# Patient Record
Sex: FEMALE | Race: WHITE | NOT HISPANIC OR LATINO | Employment: FULL TIME | URBAN - METROPOLITAN AREA
[De-identification: names, ages, dates, MRNs, and addresses within clinical notes are randomized per-mention and may not be internally consistent; named-entity substitution may affect disease eponyms.]

---

## 2023-04-11 LAB
CHLAMYDIA TRACH., AMPLIFIED: NEGATIVE
N. GONORRHEA, AMPLIFIED: NEGATIVE
TRICHOMONAS VAGINALIS: NEGATIVE

## 2023-04-12 LAB — URINE CULTURE: NORMAL

## 2023-08-10 ENCOUNTER — OFFICE VISIT (OUTPATIENT)
Dept: PRIMARY CARE | Facility: CLINIC | Age: 23
End: 2023-08-10
Payer: COMMERCIAL

## 2023-08-10 VITALS
BODY MASS INDEX: 26.37 KG/M2 | SYSTOLIC BLOOD PRESSURE: 120 MMHG | DIASTOLIC BLOOD PRESSURE: 80 MMHG | WEIGHT: 144.2 LBS | HEART RATE: 70 BPM

## 2023-08-10 DIAGNOSIS — G43.109 MIGRAINE WITH AURA AND WITHOUT STATUS MIGRAINOSUS, NOT INTRACTABLE: Primary | ICD-10-CM

## 2023-08-10 PROBLEM — R56.9 SEIZURE-LIKE ACTIVITY (MULTI): Status: RESOLVED | Noted: 2023-08-10 | Resolved: 2023-08-10

## 2023-08-10 PROBLEM — N92.0 MENORRHAGIA WITH REGULAR CYCLE: Status: RESOLVED | Noted: 2023-08-10 | Resolved: 2023-08-10

## 2023-08-10 PROBLEM — N94.6 DYSMENORRHEA: Status: RESOLVED | Noted: 2023-08-10 | Resolved: 2023-08-10

## 2023-08-10 PROBLEM — H81.10 BPPV (BENIGN PAROXYSMAL POSITIONAL VERTIGO): Status: ACTIVE | Noted: 2023-08-10

## 2023-08-10 PROCEDURE — 1036F TOBACCO NON-USER: CPT | Performed by: INTERNAL MEDICINE

## 2023-08-10 PROCEDURE — 99214 OFFICE O/P EST MOD 30 MIN: CPT | Performed by: INTERNAL MEDICINE

## 2023-08-10 RX ORDER — CLINDAMYCIN PHOSPHATE 10 UG/ML
LOTION TOPICAL
COMMUNITY
Start: 2012-08-13 | End: 2024-02-06 | Stop reason: WASHOUT

## 2023-08-10 RX ORDER — DROSPIRENONE 4 MG/1
1 TABLET, FILM COATED ORAL DAILY
COMMUNITY
Start: 2023-07-06 | End: 2024-03-29 | Stop reason: SDUPTHER

## 2023-08-10 RX ORDER — RIZATRIPTAN BENZOATE 10 MG/1
10 TABLET, ORALLY DISINTEGRATING ORAL ONCE AS NEEDED
Qty: 9 TABLET | Refills: 1 | Status: SHIPPED | OUTPATIENT
Start: 2023-08-10 | End: 2023-09-09

## 2023-08-10 RX ORDER — ADAPALENE 0.1 G/100G
CREAM TOPICAL
COMMUNITY
Start: 2012-08-13 | End: 2024-02-06 | Stop reason: WASHOUT

## 2023-08-10 ASSESSMENT — ENCOUNTER SYMPTOMS
RHINORRHEA: 0
SORE THROAT: 0
SINUS PAIN: 0
COUGH: 0
NUMBNESS: 0
PALPITATIONS: 0
LIGHT-HEADEDNESS: 0
WEAKNESS: 0
WHEEZING: 0
EYE ITCHING: 0
EYE DISCHARGE: 0
EYE PAIN: 0
SINUS PRESSURE: 0
PHOTOPHOBIA: 0
HEADACHES: 1
ACTIVITY CHANGE: 0
SHORTNESS OF BREATH: 0
FACIAL SWELLING: 0
FATIGUE: 0
DIZZINESS: 0

## 2023-08-10 NOTE — PATIENT INSTRUCTIONS
This sounds to be classic for a migraine with aura.  You can use ibuprofen but I also have called in a prescription for rizatriptan, which is a tablet that is sublingual, disintegrating under your tongue, to be used at the onset of the aura to prevent this from intensifying.  If this is not beneficial, or if you are continuing to have increasing frequency of these headaches, please let us know so that we can consider further evaluation with imaging studies.  Please keep us updated and please feel free to reach out with any questions or concerns.

## 2023-08-10 NOTE — PROGRESS NOTES
Beverly Bradley comes in today for migraines.      Beverly comes in today for migraines.  She states that she had these previously, was getting these every few months.  Typically these responded to ibuprofen.  Since she switched her birth control, she had not been getting these.  She is now on a progesterone only pill.  However, last week she had a migraine with a classic aura in her left eye, mainly a pulsing light.  This lasted for about 30 minutes.  She did take some ibuprofen and this helped the headache.  She has not had any nausea with this nor any peripheral neurologic symptoms.  She has not had any ongoing vision changes since that time and has not had any recurrence.  She wanted to come in to have this evaluated and to ensure that there was no other concerning findings.  She otherwise feels well denying any additional concerns.  Her only prescription medication is her progesterone only pill.    Migraine   Pertinent negatives include no coughing, dizziness, ear pain, eye pain, numbness, photophobia, rhinorrhea, sinus pressure, sore throat or weakness.       Review of Systems   Constitutional:  Negative for activity change and fatigue.   HENT:  Negative for congestion, ear discharge, ear pain, facial swelling, postnasal drip, rhinorrhea, sinus pressure, sinus pain and sore throat.    Eyes:  Positive for visual disturbance. Negative for photophobia, pain, discharge and itching.   Respiratory:  Negative for cough, shortness of breath and wheezing.    Cardiovascular:  Negative for chest pain and palpitations.   Neurological:  Positive for headaches. Negative for dizziness, syncope, weakness, light-headedness and numbness.       Objective   Physical Exam  Constitutional:       Appearance: Normal appearance.   Eyes:      General: No scleral icterus.     Extraocular Movements: Extraocular movements intact.      Conjunctiva/sclera: Conjunctivae normal.      Pupils: Pupils are equal, round, and reactive to light.       Funduscopic exam:     Right eye: No AV nicking or papilledema.         Left eye: No AV nicking or papilledema.   Cardiovascular:      Rate and Rhythm: Normal rate and regular rhythm.      Pulses: Normal pulses.      Heart sounds: Normal heart sounds. No murmur heard.     No gallop.   Pulmonary:      Effort: Pulmonary effort is normal. No respiratory distress.   Neurological:      General: No focal deficit present.      Mental Status: She is alert and oriented to person, place, and time. Mental status is at baseline.      Cranial Nerves: No cranial nerve deficit.      Sensory: No sensory deficit.      Motor: No weakness.      Gait: Gait normal.      Deep Tendon Reflexes: Reflexes normal.   Psychiatric:         Mood and Affect: Mood normal.         Behavior: Behavior normal.         Thought Content: Thought content normal.         Judgment: Judgment normal.         Assessment/Plan   1.  Migraine with aura: This sounds classic for migraine with aura.  She can continue with ibuprofen but also have discussed using a triptan for abortive therapy.  This has been called into her pharmacy.  Have discussed side effect profile.  If she has any increasing frequency of these, we could consider imaging studies.  At this time, however, I think we can manage conservatively.  Again, if these begin happening more frequently, we will need to have further evaluation.  She is to keep us updated and is to call with any additional questions.  Problem List Items Addressed This Visit    None

## 2023-10-30 PROBLEM — N94.6 DYSMENORRHEA: Status: ACTIVE | Noted: 2023-10-30

## 2023-10-30 PROBLEM — Z32.02 URINE PREGNANCY TEST NEGATIVE: Status: ACTIVE | Noted: 2023-10-30

## 2023-10-30 PROBLEM — G43.109 MIGRAINE WITH VISUAL AURA: Status: ACTIVE | Noted: 2023-10-30

## 2023-10-30 RX ORDER — MULTIVITAMIN
1 TABLET ORAL
COMMUNITY
Start: 2016-06-12

## 2023-10-30 RX ORDER — VALACYCLOVIR HYDROCHLORIDE 1 G/1
2 TABLET, FILM COATED ORAL 2 TIMES DAILY
COMMUNITY
Start: 2016-06-12 | End: 2024-02-06 | Stop reason: WASHOUT

## 2023-10-31 ENCOUNTER — APPOINTMENT (OUTPATIENT)
Dept: OBSTETRICS AND GYNECOLOGY | Facility: CLINIC | Age: 23
End: 2023-10-31
Payer: COMMERCIAL

## 2024-02-06 ENCOUNTER — OFFICE VISIT (OUTPATIENT)
Dept: PRIMARY CARE | Facility: CLINIC | Age: 24
End: 2024-02-06
Payer: COMMERCIAL

## 2024-02-06 VITALS
DIASTOLIC BLOOD PRESSURE: 71 MMHG | HEART RATE: 73 BPM | WEIGHT: 132.8 LBS | BODY MASS INDEX: 24.29 KG/M2 | SYSTOLIC BLOOD PRESSURE: 121 MMHG

## 2024-02-06 DIAGNOSIS — R10.13 DYSPEPSIA: Primary | ICD-10-CM

## 2024-02-06 DIAGNOSIS — K21.9 GASTROESOPHAGEAL REFLUX DISEASE WITHOUT ESOPHAGITIS: ICD-10-CM

## 2024-02-06 PROBLEM — Z32.02 URINE PREGNANCY TEST NEGATIVE: Status: RESOLVED | Noted: 2023-10-30 | Resolved: 2024-02-06

## 2024-02-06 PROBLEM — G43.109 MIGRAINE WITH VISUAL AURA: Status: ACTIVE | Noted: 2023-08-10

## 2024-02-06 PROCEDURE — 99213 OFFICE O/P EST LOW 20 MIN: CPT | Performed by: INTERNAL MEDICINE

## 2024-02-06 PROCEDURE — 1036F TOBACCO NON-USER: CPT | Performed by: INTERNAL MEDICINE

## 2024-02-06 ASSESSMENT — ENCOUNTER SYMPTOMS
ACTIVITY CHANGE: 0
FATIGUE: 0
CONSTIPATION: 0
ABDOMINAL PAIN: 1
NUMBNESS: 0
HEADACHES: 0
LIGHT-HEADEDNESS: 0
CHEST TIGHTNESS: 0
SHORTNESS OF BREATH: 0
DIARRHEA: 0
BLOOD IN STOOL: 0
ABDOMINAL DISTENTION: 0
HEMATURIA: 0
DIZZINESS: 0
NAUSEA: 1
COUGH: 0
WHEEZING: 0
FEVER: 0
DYSURIA: 0
PALPITATIONS: 0
VOMITING: 0
WEAKNESS: 0

## 2024-02-06 NOTE — PATIENT INSTRUCTIONS
I would recommend a trial of over-the-counter PPI therapy.  Examples of this are Prilosec (omeprazole), Prevacid (lansoprazole), or Nexium (esomeprazole).  This is best taken once daily 30 minutes prior to your first meal of the day.  Please avoid trigger foods such as spicy foods, caffeine, alcohol.  If you have persistence of symptoms, we would definitely want to proceed with further evaluation with blood work and even consider a right upper quadrant ultrasound.  Please give us an update on how you are doing in about 2 weeks.  If you have any questions prior to that time, please feel free to reach out.

## 2024-02-06 NOTE — PROGRESS NOTES
Beverly Bradley comes in today for heartburn symptoms.      Beverly comes in today with some generalized symptoms of heartburn.  She states that she always has battled a mild upset stomach.  However for the past months she has noticed more heartburn symptoms.  She notices this most after spicy foods.  She will take Tums which helps.  She has tried to eliminate some of the spicier foods and peppers which she has noticed triggers this as well.  She tends not to eat much in the morning, tends to be much hungrier in the evening, eating most of her meals at that time.  She does have 2 cups of coffee per day, a mild amount of alcohol socially, nothing excessive.  She does not smoke.  She has had occasional nausea with this no vomiting.  After she eats she will occasionally feel some discomfort in her upper abdomen but this is mainly heartburn symptoms.  She will have some soft looser stools in the morning, this is a more ongoing symptom, not recently changed.  There has been no blood nor melena nor any weight loss nor change in appetite.  She is here to discuss what options she has for treatment.  She otherwise feels well denying any other concerns.  Her only current prescription is birth control pills.        Review of Systems   Constitutional:  Negative for activity change, fatigue and fever.   Respiratory:  Negative for cough, chest tightness, shortness of breath and wheezing.    Cardiovascular:  Negative for chest pain, palpitations and leg swelling.   Gastrointestinal:  Positive for abdominal pain and nausea. Negative for abdominal distention, blood in stool, constipation, diarrhea and vomiting.   Genitourinary:  Negative for dysuria and hematuria.   Neurological:  Negative for dizziness, weakness, light-headedness, numbness and headaches.       Objective   Physical Exam  Constitutional:       Appearance: Normal appearance. She is normal weight.   Cardiovascular:      Rate and Rhythm: Normal rate and regular rhythm.       Pulses: Normal pulses.      Heart sounds: Normal heart sounds. No murmur heard.     No gallop.   Pulmonary:      Effort: Pulmonary effort is normal. No respiratory distress.      Breath sounds: Normal breath sounds. No wheezing, rhonchi or rales.   Abdominal:      General: Abdomen is flat. Bowel sounds are normal. There is no distension.      Palpations: Abdomen is soft. There is no mass.      Tenderness: There is no abdominal tenderness. There is no guarding or rebound.   Musculoskeletal:      Right lower leg: No edema.      Left lower leg: No edema.   Neurological:      Mental Status: She is alert.         Assessment/Plan   1.  Likely GERD with dyspepsia: Have recommended trial of over-the-counter PPI therapy for 2 weeks.  I discussed possibly checking lab studies, she would like to defer if possible.  If symptoms are persistent after 2 weeks of over-the-counter PPI, we would need to check comprehensive lab studies as well as consider a right upper quadrant ultrasound.  She will keep us updated on how she is responding especially if symptoms persist or worsen.  She is to call with any additional concerns.  Problem List Items Addressed This Visit    None

## 2024-03-28 ENCOUNTER — TELEPHONE (OUTPATIENT)
Dept: OBSTETRICS AND GYNECOLOGY | Facility: CLINIC | Age: 24
End: 2024-03-28
Payer: COMMERCIAL

## 2024-03-28 NOTE — TELEPHONE ENCOUNTER
----- Message from Shell Chew RN sent at 3/26/2024  5:05 PM EDT -----  Regarding: Slynd refill  Requesting refill on Slynd  APE due in April    3/28/2024 - -Message to SB   Okay to refill, patient to schedule APE, need pharmacy information. Left message to call RN

## 2024-03-29 ENCOUNTER — TELEPHONE (OUTPATIENT)
Dept: OBSTETRICS AND GYNECOLOGY | Facility: CLINIC | Age: 24
End: 2024-03-29
Payer: COMMERCIAL

## 2024-03-29 DIAGNOSIS — Z30.41 ENCOUNTER FOR SURVEILLANCE OF CONTRACEPTIVE PILLS: Primary | ICD-10-CM

## 2024-03-29 RX ORDER — DROSPIRENONE 4 MG/1
1 TABLET, FILM COATED ORAL DAILY
Qty: 84 TABLET | Refills: 0 | Status: SHIPPED | OUTPATIENT
Start: 2024-03-29

## 2024-03-29 RX ORDER — DROSPIRENONE 4 MG/1
1 TABLET, FILM COATED ORAL DAILY
Qty: 84 TABLET | Refills: 0 | OUTPATIENT
Start: 2024-03-29

## 2024-03-29 NOTE — TELEPHONE ENCOUNTER
----- Message from Shell Chew RN sent at 3/26/2024  5:05 PM EDT -----  Regarding: Slynd refill  Requesting refill on Slynd  APE due in April    3/28/2024 - -Message to Rolando to refill, patient to schedule APE, need pharmacy information. Left message to call RN

## 2024-03-29 NOTE — TELEPHONE ENCOUNTER
----- Message from Shell Chew RN sent at 3/26/2024  5:05 PM EDT -----  Regarding: Slynd refill  Requesting refill on Slynd  APE due in April

## 2024-03-29 NOTE — TELEPHONE ENCOUNTER
Patient returned call  Verified [facility] pharmacy  Will call to schedule APE in April

## 2024-05-09 DIAGNOSIS — Z30.41 ENCOUNTER FOR SURVEILLANCE OF CONTRACEPTIVE PILLS: Primary | ICD-10-CM

## 2024-05-09 RX ORDER — NORETHINDRONE 0.35 MG/1
1 TABLET ORAL DAILY
Qty: 28 TABLET | Refills: 11 | Status: SHIPPED | OUTPATIENT
Start: 2024-05-09 | End: 2025-05-09

## 2024-06-06 ENCOUNTER — APPOINTMENT (OUTPATIENT)
Dept: OBSTETRICS AND GYNECOLOGY | Facility: CLINIC | Age: 24
End: 2024-06-06
Payer: COMMERCIAL

## 2024-06-21 ENCOUNTER — OFFICE VISIT (OUTPATIENT)
Dept: OBSTETRICS AND GYNECOLOGY | Facility: CLINIC | Age: 24
End: 2024-06-21
Payer: COMMERCIAL

## 2024-06-21 VITALS
HEIGHT: 62 IN | DIASTOLIC BLOOD PRESSURE: 81 MMHG | WEIGHT: 131.6 LBS | BODY MASS INDEX: 24.22 KG/M2 | SYSTOLIC BLOOD PRESSURE: 135 MMHG | HEART RATE: 99 BPM

## 2024-06-21 DIAGNOSIS — Z30.41 ENCOUNTER FOR SURVEILLANCE OF CONTRACEPTIVE PILLS: ICD-10-CM

## 2024-06-21 DIAGNOSIS — Z11.3 SCREENING FOR STDS (SEXUALLY TRANSMITTED DISEASES): ICD-10-CM

## 2024-06-21 DIAGNOSIS — Z01.419 VISIT FOR GYNECOLOGIC EXAMINATION: Primary | ICD-10-CM

## 2024-06-21 PROCEDURE — 99395 PREV VISIT EST AGE 18-39: CPT | Performed by: ADVANCED PRACTICE MIDWIFE

## 2024-06-21 PROCEDURE — 87491 CHLMYD TRACH DNA AMP PROBE: CPT | Performed by: ADVANCED PRACTICE MIDWIFE

## 2024-06-21 PROCEDURE — 1036F TOBACCO NON-USER: CPT | Performed by: ADVANCED PRACTICE MIDWIFE

## 2024-06-21 PROCEDURE — 87661 TRICHOMONAS VAGINALIS AMPLIF: CPT | Performed by: ADVANCED PRACTICE MIDWIFE

## 2024-06-21 RX ORDER — NORETHINDRONE 0.35 MG/1
1 TABLET ORAL DAILY
Qty: 28 TABLET | Refills: 11 | Status: SHIPPED | OUTPATIENT
Start: 2024-06-21 | End: 2025-06-21

## 2024-06-21 ASSESSMENT — PAIN SCALES - GENERAL: PAINLEVEL: 0-NO PAIN

## 2024-06-21 ASSESSMENT — ENCOUNTER SYMPTOMS
GASTROINTESTINAL NEGATIVE: 1
CARDIOVASCULAR NEGATIVE: 1
ALLERGIC/IMMUNOLOGIC NEGATIVE: 1
RESPIRATORY NEGATIVE: 1
NEUROLOGICAL NEGATIVE: 1
CONSTITUTIONAL NEGATIVE: 1
ENDOCRINE NEGATIVE: 1
EYES NEGATIVE: 1
MUSCULOSKELETAL NEGATIVE: 1
PSYCHIATRIC NEGATIVE: 1
HEMATOLOGIC/LYMPHATIC NEGATIVE: 1

## 2024-06-21 NOTE — PROGRESS NOTES
"Subjective   Beverly Bradley is a 23 y.o. female who is here for a routine exam.     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       Review of Systems   Constitutional: Negative.    HENT: Negative.     Eyes: Negative.    Respiratory: Negative.     Cardiovascular: Negative.    Gastrointestinal: Negative.    Endocrine: Negative.    Genitourinary: Negative.    Musculoskeletal: Negative.    Skin: Negative.    Allergic/Immunologic: Negative.    Neurological: Negative.    Hematological: Negative.    Psychiatric/Behavioral: Negative.         Objective   /81   Pulse 99   Ht 1.575 m (5' 2\")   Wt 59.7 kg (131 lb 9.6 oz)   LMP 2024 (Exact Date)   BMI 24.07 kg/m²   Physical Exam  Vitals reviewed.   Constitutional:       General: She is not in acute distress.     Appearance: Normal appearance. She is well-developed and well-groomed.   Neck:      Thyroid: No thyroid mass, thyromegaly or thyroid tenderness.   Cardiovascular:      Rate and Rhythm: Normal rate and regular rhythm. No extrasystoles are present.  Pulmonary:      Effort: Pulmonary effort is normal.      Breath sounds: Normal breath sounds.   Chest:   Breasts:     Right: No inverted nipple, mass, nipple discharge, skin change or tenderness.      Left: No inverted nipple, mass, nipple discharge, skin change or " tenderness.   Abdominal:      General: Abdomen is flat. There is no distension.      Palpations: Abdomen is soft.      Tenderness: There is no abdominal tenderness. There is no right CVA tenderness or left CVA tenderness.   Genitourinary:     Comments: deferred  Skin:     General: Skin is warm and dry.   Neurological:      Mental Status: She is alert.   Psychiatric:         Mood and Affect: Mood and affect normal.         Speech: Speech normal.         Behavior: Behavior normal. Behavior is cooperative.          Assessment/Plan   Problem List Items Addressed This Visit    None  Visit Diagnoses       Visit for gynecologic examination    -  Primary    Relevant Orders    CBC    C. Trachomatis / N. Gonorrhoeae, Amplified Detection    Hemoglobin A1c    Hepatitis C Antibody    HIV-1 and HIV-2 antibodies    Syphilis Screen with Reflex    TSH with reflex to Free T4 if abnormal    Lipid Panel Non-Fasting    Encounter for surveillance of contraceptive pills        Relevant Medications    norethindrone (Micronor) 0.35 mg tablet    Screening for STDs (sexually transmitted diseases)        Relevant Orders    CBC    C. Trachomatis / N. Gonorrhoeae, Amplified Detection    Hemoglobin A1c    Hepatitis C Antibody    HIV-1 and HIV-2 antibodies    Syphilis Screen with Reflex    TSH with reflex to Free T4 if abnormal    Trichomonas vaginalis, Amplified    Hepatitis B surface Ag             All questions answered.  Blood tests: CBC with diff, TSH, and A1c STI panel .  Breast self exam technique reviewed and patient encouraged to perform self-exam monthly.  Discussed healthy lifestyle modifications.  .  RTC 1yr  Jailene MIRELES CNM

## 2024-06-22 LAB
C TRACH RRNA SPEC QL NAA+PROBE: NEGATIVE
N GONORRHOEA DNA SPEC QL PROBE+SIG AMP: NEGATIVE
T VAGINALIS RRNA SPEC QL NAA+PROBE: NEGATIVE

## 2024-07-22 ENCOUNTER — PATIENT MESSAGE (OUTPATIENT)
Dept: OBSTETRICS AND GYNECOLOGY | Facility: CLINIC | Age: 24
End: 2024-07-22
Payer: COMMERCIAL

## 2024-07-22 DIAGNOSIS — N94.6 DYSMENORRHEA TREATED WITH ORAL CONTRACEPTIVE: ICD-10-CM

## 2024-07-22 DIAGNOSIS — Z79.3 DYSMENORRHEA TREATED WITH ORAL CONTRACEPTIVE: ICD-10-CM

## 2024-07-22 DIAGNOSIS — Z30.41 ENCOUNTER FOR SURVEILLANCE OF CONTRACEPTIVE PILLS: Primary | ICD-10-CM

## 2025-01-14 ENCOUNTER — HOSPITAL ENCOUNTER (EMERGENCY)
Facility: HOSPITAL | Age: 25
Discharge: HOME | End: 2025-01-14
Payer: COMMERCIAL

## 2025-01-14 ENCOUNTER — APPOINTMENT (OUTPATIENT)
Dept: RADIOLOGY | Facility: HOSPITAL | Age: 25
End: 2025-01-14
Payer: COMMERCIAL

## 2025-01-14 VITALS
TEMPERATURE: 97.5 F | DIASTOLIC BLOOD PRESSURE: 95 MMHG | HEART RATE: 110 BPM | SYSTOLIC BLOOD PRESSURE: 145 MMHG | WEIGHT: 130 LBS | OXYGEN SATURATION: 100 % | HEIGHT: 62 IN | RESPIRATION RATE: 15 BRPM | BODY MASS INDEX: 23.92 KG/M2

## 2025-01-14 DIAGNOSIS — S16.1XXA CERVICAL STRAIN, ACUTE, INITIAL ENCOUNTER: Primary | ICD-10-CM

## 2025-01-14 DIAGNOSIS — R51.9 NONINTRACTABLE HEADACHE, UNSPECIFIED CHRONICITY PATTERN, UNSPECIFIED HEADACHE TYPE: ICD-10-CM
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
## 2025-01-14 PROCEDURE — 72125 CT NECK SPINE W/O DYE: CPT | Performed by: RADIOLOGY

## 2025-01-14 PROCEDURE — 70450 CT HEAD/BRAIN W/O DYE: CPT | Performed by: RADIOLOGY

## 2025-01-14 PROCEDURE — 70450 CT HEAD/BRAIN W/O DYE: CPT

## 2025-01-14 PROCEDURE — 99284 EMERGENCY DEPT VISIT MOD MDM: CPT | Mod: 25

## 2025-01-14 PROCEDURE — 72125 CT NECK SPINE W/O DYE: CPT

## 2025-01-14 RX ORDER — TIZANIDINE 4 MG/1
4 TABLET ORAL EVERY 6 HOURS PRN
Qty: 30 TABLET | Refills: 0 | Status: SHIPPED | OUTPATIENT
Start: 2025-01-14 | End: 2025-01-24

## 2025-01-14 ASSESSMENT — COLUMBIA-SUICIDE SEVERITY RATING SCALE - C-SSRS
2. HAVE YOU ACTUALLY HAD ANY THOUGHTS OF KILLING YOURSELF?: NO
1. IN THE PAST MONTH, HAVE YOU WISHED YOU WERE DEAD OR WISHED YOU COULD GO TO SLEEP AND NOT WAKE UP?: NO
6. HAVE YOU EVER DONE ANYTHING, STARTED TO DO ANYTHING, OR PREPARED TO DO ANYTHING TO END YOUR LIFE?: NO

## 2025-01-14 ASSESSMENT — PAIN SCALES - GENERAL: PAINLEVEL_OUTOF10: 0 - NO PAIN

## 2025-01-14 ASSESSMENT — LIFESTYLE VARIABLES
EVER HAD A DRINK FIRST THING IN THE MORNING TO STEADY YOUR NERVES TO GET RID OF A HANGOVER: NO
HAVE YOU EVER FELT YOU SHOULD CUT DOWN ON YOUR DRINKING: NO
TOTAL SCORE: 0
HAVE PEOPLE ANNOYED YOU BY CRITICIZING YOUR DRINKING: NO
EVER FELT BAD OR GUILTY ABOUT YOUR DRINKING: NO

## 2025-01-14 ASSESSMENT — PAIN - FUNCTIONAL ASSESSMENT: PAIN_FUNCTIONAL_ASSESSMENT: 0-10

## 2025-01-14 NOTE — Clinical Note
Beverly Bradley was seen and treated in our emergency department on 1/14/2025.  She may return to work on 01/16/2025.  Return Thursday if feeling better     If you have any questions or concerns, please don't hesitate to call.      Rashida Osorio, APRN-CNP
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
## 2025-03-14 ENCOUNTER — APPOINTMENT (OUTPATIENT)
Dept: PRIMARY CARE | Facility: CLINIC | Age: 25
End: 2025-03-14
Payer: COMMERCIAL

## 2025-04-11 ENCOUNTER — OFFICE VISIT (OUTPATIENT)
Dept: OBSTETRICS AND GYNECOLOGY | Facility: CLINIC | Age: 25
End: 2025-04-11
Payer: COMMERCIAL

## 2025-04-11 VITALS
BODY MASS INDEX: 24.49 KG/M2 | SYSTOLIC BLOOD PRESSURE: 130 MMHG | HEART RATE: 97 BPM | DIASTOLIC BLOOD PRESSURE: 82 MMHG | WEIGHT: 133.9 LBS

## 2025-04-11 DIAGNOSIS — Z12.4 CERVICAL CANCER SCREENING: ICD-10-CM

## 2025-04-11 DIAGNOSIS — Z00.00 WELL WOMAN EXAM WITHOUT GYNECOLOGICAL EXAM: Primary | ICD-10-CM

## 2025-04-11 PROCEDURE — 99213 OFFICE O/P EST LOW 20 MIN: CPT | Performed by: ADVANCED PRACTICE MIDWIFE

## 2025-04-11 ASSESSMENT — ENCOUNTER SYMPTOMS
DIARRHEA: 0
ABDOMINAL PAIN: 0
NAUSEA: 0
CONSTIPATION: 0

## 2025-04-11 ASSESSMENT — PAIN SCALES - GENERAL: PAINLEVEL_OUTOF10: 0-NO PAIN

## 2025-04-11 ASSESSMENT — PATIENT HEALTH QUESTIONNAIRE - PHQ9
SUM OF ALL RESPONSES TO PHQ9 QUESTIONS 1 AND 2: 0
2. FEELING DOWN, DEPRESSED OR HOPELESS: NOT AT ALL
1. LITTLE INTEREST OR PLEASURE IN DOING THINGS: NOT AT ALL

## 2025-04-11 NOTE — PATIENT INSTRUCTIONS
[email or web address]    MIRENA IUD  Mirena is a hormonal IUD (intrauterine device) that releases progestin into the uterus to prevent pregnancy and treat heavy menstrual bleeding. It's a T-shaped device inserted by a healthcare provider and can last for up to 8 years. Mirena is also FDA-approved to treat heavy menstrual bleeding for up to 5 years in women who choose an IUD for contraception.   Here's a more detailed look:  How it works:  Mirena works by releasing progestin, which thickens the cervical mucus, making it difficult for sperm to reach the egg, and thinning the uterine lining, making it harder for a fertilized egg to implant.   Benefits:  Mirena is a highly effective birth control method, with a success rate of over 99%. It can also reduce heavy menstrual bleeding and may lead to lighter or even absent periods.   Side effects:  Some common side effects can include spotting, irregular bleeding, and in some cases, longer periods. More rare side effects can include pelvic inflammatory disease, perforation, and expulsion.   Important notes:  Mirena does not protect against sexually transmitted infections (STIs). It's also essential to consult with a healthcare provider to determine if Mirena is the right choice for you.     Contraceptive implant NEXPLANON  Peer reviewed by Dr Carolyn Harrell, [facility] [place] updated by Dr Judd Bray updated 9 Jan 2025    Meets Patient’s editorial guidelines    Download  Article PDF has been downloaded    Share  Share    Anvato via email  Copy link  Article link copied to clipboard    In this series:  Long-acting reversible contraceptives  Contraceptive injection  Intrauterine contraceptive device  Levonorgestrel intrauterine device    A contraceptive implant is a device that is put under the skin in order to offer you an even dose of contraception without you having to take a daily pill.    In this article:  What is the  contraceptive implant?  Effectiveness  Timing of fitting  How does the contraceptive implant work?  How long do contraceptive implants last?  How is the contraceptive implant put in?  How is the contraceptive implant taken out?  When should the contraceptive implant be put in?  Side-effects of the contraceptive implant  Does the contraceptive implant stop periods?  Does the contraceptive implant cause weight gain?  Risks from using the contraceptive implant  Can I have the implant after emergency contraception?  Can a contraceptive implant be used when breastfeeding?  Can anything make an implant less effective?  Who should not have a contraceptive implant?  If I have a contraceptive implant, what do I do when I want to try to get pregnant?  Where can I get the contraceptive implant?  What is the contraceptive implant?  A contraceptive implant is a small, flexible teresita about the size of a matchstick. The implant contains a progestogen hormone which provides contraception (prevents pregnancy) without you having to take a daily pill.    The teresita is put under the skin on your arm. The only contraceptive implant currently available in the [place] is Nexplanon® (there are other devices available elsewhere in the world).    Nexplanon® is 40 mm long and 2 mm wide. That is, about the size of a normal matchstick.    Effectiveness  One of the benefits of the implant is that it is the most effective contraceptive (birth control) method available. The contraceptive implant is over 99% effective. Only around 1 in 2,000 sexually active women using the implant will become pregnant whilst using it. Some women may appear to have become pregnant whilst using the implant, but when things are checked more carefully, they were actually pregnant before it was put in.    Timing of fitting  If an implant is put in during the first five days of your period, you are protected against pregnancy immediately. If an implant is put in at any other time  during your cycle, you are not protected until seven days have passed. You will need to use additional contraception if you wish to have sexual intercourse during this time.    You should only have an implant put in if it is certain that you are not already pregnant. You may still need to use contraception such as condoms to protect against sexually transmitted infections (STIs), for example if you are in a new relationship or have any concern that you might be at risk of an STI.    How does the contraceptive implant work?  Playlist: Contraceptive Implant  4 videos    Autoplay videos      How does the implant work on the body?  Dr Mirlande Chang MBE, [facility] [place]  00:15  How does the implant work on the body?  Dr. Mirlande Chang MBE, [facility] [place]    00:14  How long after the implant is inserted can you be sexually active?  Dr. Mirlande Chang MBE, [facility] [place]    00:14  Can you get pregnant when you have the implant?  Dr. Mirlande Chang MBE, [facility] [place]    00:12  Can you get the implant removed early?  Dr. Mirlande Chang MBE, [facility] [place]    The progestogen hormone in the implant is called etonogestrel. It is released into the bloodstream at a slow, steady rate.    The contraceptive implant works mainly by stopping the release of the egg from the ovary. It also thickens the cervical mucus which forms a plug in the neck of the womb (cervix). This stops sperm getting through to the womb (uterus) to fertilise an egg.    It also makes the lining of the womb thinner. This means that if an egg were to fertilise, it would not be likely to be able to attach to the womb (implantation).    In order for you to get pregnant you need all of these things to be working (ovulation, sperm getting through the cervix, and implantation). The contraceptive implant blocks all three stages.    How long do contraceptive implants last?  The contraceptive implant is fully effective for three years, but it stops being effective if it is removed. After three years, if  you want to continue using this method of contraception, you will need a new implant. It is a type of contraception called a LARC (long-acting reversible contraception).    How is the contraceptive implant put in?  The contraceptive implant is about the size of a matchstick and is placed under the skin of the inner side of your upper arm. A trained doctor or nurse will put the implant in.    An injection of local anaesthetic is used to numb the skin.    A special device is used to place the implant under the skin. The wound is dressed and will soon heal.    The procedure only takes a few minutes. The area around the implant may be bruised and tender for a few days.    How is the contraceptive implant taken out?  A trained doctor or nurse must take your implant out. The procedure is similar to putting the implant in.    An injection of local anaesthetic is used to numb the skin.    A tiny cut is made in your skin the implant is gently pulled out.    A dressing will be put on your arm which should be left in place for a few days. You may also have Steristrips® (paper stitches)    Implanon v  contraceptive implant Implanon   Gwendolyn Espana, CC BY-SA 4.0 , via [employer]    Occasionally, an implant is difficult to feel under the skin, in which case you may be referred to a specialist centre to have it removed with the help of an ultrasound scan. If you want to carry on using an implant, the doctor or nurse can put a new one in at the same time, usually in the other arm. If you do this there will be no break in your contraceptive protection.    The implant can be taken out at any time if you request removal. It loses its effect immediately after being removed.    When should the contraceptive implant be put in?  You can have an implant fitted at any time in your menstrual cycle if it is certain that you are not pregnant.    If the implant is put in during the first five days of your period you will be protected  against pregnancy immediately.    After day 5 and if you haven't had unprotected sexual intercourse (UPSI) since your last period, the implant can be inserted immediately but you should avoid sexual intercourse or use a barrier method of contraception (such as condoms) for 7 days.    If you have had unprotected sexual intercourse since your last period you may need a pregnancy test and emergency contraception. You should discuss this with your clinician.    If you have recently had a baby the implant can be put in at any time after the birth. If the implant is inserted on or before day 21, your contraceptive protection starts straightaway. (You can't become pregnant in the first 21 days after delivery, so as long as you have it inserted any time up to day 21 you are protected.)    If it is fitted later than this and you have had unprotected sexual intercourse, you may need a pregnancy test and emergency contraception. You should discuss this with your clinician.    If you have had a termination of pregnancy or a miscarriage the implant can be put in at the same time, or in the first five days, and is effective immediately.    It is sometimes possible to have an implant fitted even if you aren't certain that you are not pregnant. For example, if you have had unprotected sex and it is too early for a pregnancy test to be reliable. This is called quick starting. If you quick start the implant, it is important that you do a pregnancy test at least three weeks after your last episode of unprotected sex. This test would be bought from a  or supermarket and not usually supplied by the GP. Your doctor or nurse will talk you through the pros and cons of quick starting.    Side-effects of the contraceptive implant  Most side-effects caused by the contraceptive implant occur when you first start using the implant. They are not usually severe. Of this list, changes in your period is the commonest side-effect.    The most  common side-effects are:    Changes in your periods (see below).    Fluid retention and breast tenderness.    Acne: your skin may temporarily worsen, although it can also improve.    Itching or bruising after implant insertion.    Breast tenderness.    The contraceptive implant does not usually cause altered sex drive (libido), but some women who have experienced other side-effects also say that their sex drive was reduced.    Does the contraceptive implant stop periods?  Most women experience changes in their periods with a contraceptive implant.    Most commonly, the periods will be lighter and less regular.    Some women find that their periods stop altogether with the implant (amenorrhoea).    A few women have longer, irregular periods, which can be heavy.    Changes in your periods may settle in the first three months of using the implant, and it is not ideal to remove it due to side-effects before this time. If that doesn't happen, your doctor may prescribe extra hormones on top of the implant to try and settle the bleeding. These might be used for a short period of time, or long-term.    Does the contraceptive implant cause weight gain?  There is no strong evidence that the contraceptive implant makes women put on weight.    Risks from using the contraceptive implant  Women who use progestogen only contraception such as the implant have a small increased risk of breast cancer. The risk is very small - if 100,000 women aged 16 - 20 use the implant for 5 years, there will be an extra 8 cases which happen over the next 15 years. This figure increases to 265 for women aged 35 - 39 (who are also more likely to have breast cancer without an implant) - that is just under an extra 3 cases per 1000 women over 15 years.    Can I have the implant after emergency contraception?  Levonorgestrel. After taking levonorgestrel (Levonelle®) as emergency contraception the implant can be inserted immediately. You should avoid  sex or use a barrier method of contraception (such as condoms) for 7 days. In addition you should take a pregnancy test 3 weeks or so after the time you had unprotected sex.    Ulipristal acetate. If you took ulipristal acetate (ellaOne®) the implant should be inserted 5 days after taking the tablet. You should avoid having unprotected sex or use a barrier method of contraception (such as condoms) until the implant is inserted and for 7 days after. You should also take a pregnancy test no sooner than 3 weeks after the last time you had unprotected sex.    Can a contraceptive implant be used when breastfeeding?  Yes, an implant can be used when breastfeeding. The implant will not affect your milk production and will not harm your baby.    Although breastfeeding does slightly reduce the chance of another pregnancy, it is not a reliable contraceptive and it is possible to become pregnant whilst breastfeeding.    Can anything make an implant less effective?  Some medicines may make an implant less effective. This includes some medicines used in epilepsy, HIV and tuberculosis, and Nirav's wort (a herbal remedy often used to treat headaches, mood disturbances and premenstrual syndrome).    These medications reduce the effectiveness of the implant by increasing the rate at which your body disposes of the hormone in the blood. If you are using one of these medicines you will need to consider a different or additional contraceptive method.    The implant is not affected by common antibiotics, or by an attack of diarrhoea or being sick (vomiting).    Who should not have a contraceptive implant?  Most women can have an implant fitted but there are a few exceptions. You should not have a contraceptive implant put in if you think you might be pregnant, or if you don't want to use a contraceptive method that might affect your periods.    You also should not use the contraceptive implant if you currently have breast cancer. This  is known as an absolute contraindication, that is you should never use the implant if you have breast cancer.    Other health conditions are relative contraindications, in which the risks of using the implant might outweigh the benefits. Your doctor or nurse can help you to understand how important any relative contraindication is, or how more than one risk might combine to affect your decision as to whether or not to use the implant.    Relative contraindications include:    Some types of severe heart or liver disease.    Past breast cancer.    Current unexplained vaginal bleeding.    A hereditary blood disorder called porphyria.    You have an increased risk of blood clots in the veins due to antiphospholipid syndrome, antithrombin deficiency or factor V Leiden.    You have previously had a deep vein thrombosis or pulmonary embolism.    You have migraines.    You have systemic lupus erythematosus.    You have gene mutations associated with breast cancer - for example, BRCA1.    You have cervical cancer.    You have experienced a stroke, angina or heart attack.    You have several risk factors for heart disease, such as smoking, high cholesterol, high blood pressure, diabetes.    You have had jaundice or itching caused by previous use of a hormonal contraceptive.    If I have a contraceptive implant, what do I do when I want to try to get pregnant?  If you want to try for a baby, you need to have the implant removed. Your periods will return to normal, although it can be up to three months before you get back to your old rhythm. However, it is possible to get pregnant before you have your first period.    Start pre-pregnancy care such as taking folic acid, stopping smoking and considering any regular medication you take beforehand. You can ask your doctor or nurse for further advice.    Where can I get the contraceptive implant?  The contraceptive implant has to be fitted by a specially trained nurse or doctor.  This service may be offered at your local GP surgery and, if it is, how to arrange it will probably be explained on their website. Alternatively, a family planning, sexual health or community gynaecology clinic will do this for you. You can find details of services in your area in the [place] online.    In the [place] the contraceptive implant is free of charge.    All contraceptive services are completely confidential. You will not need an internal examination, a breast examination or a smear before you can have a contraceptive implant. In some cases you may need a pregnancy test to be certain that you are not already pregnant.

## 2025-04-11 NOTE — PROGRESS NOTES
Subjective   Patient ID: Beverly Bradley is a 24 y.o. female who presents for Menstrual Problem (LMP unknown on birth control/Last pap 5/12/22/C/O irregular bleeding, wonders if birth control is the cause. /Chaperone declined./No pain. ).    HPI: The patient reports concerns about irregular menstrual cycles since starting Slynd. She describes experiencing prolonged menstrual periods lasting up to two weeks, with moderate to heavy flow requiring approximately five pad changes per day. She also had episodes of amenorrhea in some months. The patient is considering switching to a different form of contraception, such as Nexplanon or an IUD.    Review of Systems   Gastrointestinal:  Negative for abdominal pain, constipation, diarrhea and nausea.   Genitourinary:  Positive for menstrual problem. Negative for pelvic pain and vaginal pain.   All other systems reviewed and are negative.    GYN history:   Sexually active, male partner   Birth control: Slynd   Last pap smear: 05/12/2022 negative    Objective   Physical Exam  Vitals reviewed.   Constitutional:       Appearance: Normal appearance.   Genitourinary:     General: Normal vulva.      Exam position: Lithotomy position.      Cervix: Discharge (white) and friability present. No lesion or cervical bleeding.   Skin:     General: Skin is warm.   Neurological:      General: No focal deficit present.      Mental Status: She is alert and oriented to person, place, and time.   Psychiatric:         Mood and Affect: Mood normal.         Behavior: Behavior normal.         Assessment/Plan   Problem List Items Addressed This Visit    None  Visit Diagnoses         Codes    Well woman exam without gynecological exam    -  Primary Z00.00    Relevant Orders    THINPREP PAP TEST        - Discussed alternative birth control options with the patient, including Nexplanon and intrauterine devices (IUDs).   - Reviewed cervical cancer screening, and a Pap smear was collected during today’s  visit.         SAE ROBBINS CNM student 04/11/25 12:58 PM

## 2025-04-11 NOTE — PROGRESS NOTES
"Subjective   Beverly Bradley is a 24 y.o. female who is here for Menstrual Problem (LMP unknown on birth control/Last pap 22/C/O irregular bleeding, wonders if birth control is the cause. /Chaperone declined./No pain. ).     Concerns today:  ***    {Childbearing or Gisele/postmenopausal?:32376}    Sexual Activity: {Sexual activity (female):92078}  Pain with intercourse? {Yes/No:68285}  Loss of desire? {Yes/No:50790}  Able to have an orgasm? {Yes/No:94856}    History of prior STI: {STI:62007}  Desires STI screening? {yes/no:46886}    Current contraception: {contraceptive method:5051}    Last pap: ***  History of abnormal Pap smear: {yes***/no:99199::\"no\"}  Family history of uterine or ovarian cancer: {yes***/no:44729::\"no\"}    Last mammogram: ***  History of abnormal mammogram: {yes***/no:60849::\"no\"}  Family history of breast cancer: {yes***/no:40263::\"no\"}  OB History    Para Term  AB Living   0 0 0 0 0 0   SAB IAB Ectopic Multiple Live Births   0 0 0 0 0      Objective   /82 (BP Location: Right arm, Patient Position: Sitting, BP Cuff Size: Adult)   Pulse 97   Wt 60.7 kg (133 lb 14.4 oz)   LMP  (LMP Unknown)    OBGyn Exam     Assessment/Plan   {Assess/PlanSmartLinks:98367}  No follow-ups on file.  Jailene Watson, APRN-CNM    "

## 2025-04-18 ENCOUNTER — HOSPITAL ENCOUNTER (EMERGENCY)
Facility: HOSPITAL | Age: 25
Discharge: HOME | End: 2025-04-19
Payer: MEDICARE

## 2025-04-18 VITALS
BODY MASS INDEX: 23.92 KG/M2 | RESPIRATION RATE: 16 BRPM | WEIGHT: 130 LBS | SYSTOLIC BLOOD PRESSURE: 132 MMHG | OXYGEN SATURATION: 99 % | HEIGHT: 62 IN | TEMPERATURE: 97.7 F | DIASTOLIC BLOOD PRESSURE: 92 MMHG | HEART RATE: 91 BPM
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
## 2025-04-18 DIAGNOSIS — G44.319 ACUTE POST-TRAUMATIC HEADACHE, NOT INTRACTABLE: Primary | ICD-10-CM

## 2025-04-18 PROCEDURE — 99282 EMERGENCY DEPT VISIT SF MDM: CPT

## 2025-04-18 PROCEDURE — 99281 EMR DPT VST MAYX REQ PHY/QHP: CPT

## 2025-04-18 ASSESSMENT — PAIN DESCRIPTION - ORIENTATION: ORIENTATION: OUTER

## 2025-04-18 ASSESSMENT — COLUMBIA-SUICIDE SEVERITY RATING SCALE - C-SSRS
1. IN THE PAST MONTH, HAVE YOU WISHED YOU WERE DEAD OR WISHED YOU COULD GO TO SLEEP AND NOT WAKE UP?: NO
2. HAVE YOU ACTUALLY HAD ANY THOUGHTS OF KILLING YOURSELF?: NO
6. HAVE YOU EVER DONE ANYTHING, STARTED TO DO ANYTHING, OR PREPARED TO DO ANYTHING TO END YOUR LIFE?: NO

## 2025-04-18 ASSESSMENT — PAIN - FUNCTIONAL ASSESSMENT: PAIN_FUNCTIONAL_ASSESSMENT: 0-10

## 2025-04-18 ASSESSMENT — PAIN DESCRIPTION - DESCRIPTORS: DESCRIPTORS: ACHING

## 2025-04-18 ASSESSMENT — PAIN DESCRIPTION - LOCATION: LOCATION: HEAD

## 2025-04-18 ASSESSMENT — PAIN SCALES - GENERAL: PAINLEVEL_OUTOF10: 5 - MODERATE PAIN

## 2025-04-18 ASSESSMENT — PAIN DESCRIPTION - PAIN TYPE: TYPE: ACUTE PAIN

## 2025-04-18 ASSESSMENT — PAIN DESCRIPTION - FREQUENCY: FREQUENCY: CONSTANT/CONTINUOUS

## 2025-04-18 NOTE — Clinical Note
Beverly Bradley was seen and treated in our emergency department on 4/18/2025.  She may return to work on 04/22/2025.       If you have any questions or concerns, please don't hesitate to call.      Marilia Jones PA-C
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
## 2025-04-24 LAB
CYTOLOGY CMNT CVX/VAG CYTO-IMP: NORMAL
LAB AP HPV GENOTYPE QUESTION: YES
LAB AP HPV HR: NORMAL
LABORATORY COMMENT REPORT: NORMAL
PATH REPORT.TOTAL CANCER: NORMAL

## 2025-04-24 ASSESSMENT — PROMIS GLOBAL HEALTH SCALE
RATE_AVERAGE_FATIGUE: MODERATE
RATE_MENTAL_HEALTH: FAIR
RATE_AVERAGE_PAIN: 4
RATE_SOCIAL_SATISFACTION: VERY GOOD
CARRYOUT_PHYSICAL_ACTIVITIES: MOSTLY
RATE_PHYSICAL_HEALTH: GOOD
RATE_GENERAL_HEALTH: GOOD
CARRYOUT_SOCIAL_ACTIVITIES: GOOD
RATE_QUALITY_OF_LIFE: GOOD
EMOTIONAL_PROBLEMS: OFTEN

## 2025-04-25 ENCOUNTER — HOSPITAL ENCOUNTER (OUTPATIENT)
Dept: RADIOLOGY | Facility: CLINIC | Age: 25
Discharge: HOME | End: 2025-04-25
Payer: COMMERCIAL

## 2025-04-25 ENCOUNTER — APPOINTMENT (OUTPATIENT)
Dept: PRIMARY CARE | Facility: CLINIC | Age: 25
End: 2025-04-25
Payer: COMMERCIAL

## 2025-04-25 VITALS
DIASTOLIC BLOOD PRESSURE: 83 MMHG | SYSTOLIC BLOOD PRESSURE: 129 MMHG | WEIGHT: 134 LBS | BODY MASS INDEX: 24.66 KG/M2 | HEART RATE: 89 BPM | HEIGHT: 62 IN
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
## 2025-04-25 DIAGNOSIS — Z00.00 ROUTINE GENERAL MEDICAL EXAMINATION AT A HEALTH CARE FACILITY: Primary | ICD-10-CM

## 2025-04-25 DIAGNOSIS — S13.4XXD WHIPLASH INJURY TO NECK, SUBSEQUENT ENCOUNTER: ICD-10-CM

## 2025-04-25 PROBLEM — S13.4XXA INJURY OF NECK, WHIPLASH: Status: RESOLVED | Noted: 2025-04-19 | Resolved: 2025-04-25

## 2025-04-25 PROCEDURE — 3008F BODY MASS INDEX DOCD: CPT | Performed by: INTERNAL MEDICINE

## 2025-04-25 PROCEDURE — 99395 PREV VISIT EST AGE 18-39: CPT | Performed by: INTERNAL MEDICINE

## 2025-04-25 PROCEDURE — 72040 X-RAY EXAM NECK SPINE 2-3 VW: CPT

## 2025-04-25 PROCEDURE — 1036F TOBACCO NON-USER: CPT | Performed by: INTERNAL MEDICINE

## 2025-04-25 ASSESSMENT — ENCOUNTER SYMPTOMS
SHORTNESS OF BREATH: 0
HYPERACTIVE: 0
PALPITATIONS: 0
NECK STIFFNESS: 1
ADENOPATHY: 0
BRUISES/BLEEDS EASILY: 0
SEIZURES: 0
DECREASED CONCENTRATION: 0
DIZZINESS: 0
SINUS PRESSURE: 0
BACK PAIN: 0
CHEST TIGHTNESS: 0
NAUSEA: 0
WHEEZING: 0
LIGHT-HEADEDNESS: 0
NERVOUS/ANXIOUS: 0
EYE DISCHARGE: 0
ACTIVITY CHANGE: 0
CONSTIPATION: 0
COLOR CHANGE: 0
WEAKNESS: 0
FREQUENCY: 0
FEVER: 0
MYALGIAS: 1
SORE THROAT: 0
HEMATURIA: 0
VOICE CHANGE: 0
RHINORRHEA: 0
EYE ITCHING: 0
SLEEP DISTURBANCE: 0
DIARRHEA: 0
ABDOMINAL PAIN: 0
ARTHRALGIAS: 1
COUGH: 0
HEADACHES: 0
ABDOMINAL DISTENTION: 0
DYSURIA: 0
CHILLS: 0
APPETITE CHANGE: 0
SINUS PAIN: 0
DYSPHORIC MOOD: 0
NECK PAIN: 1
VOMITING: 0
DIFFICULTY URINATING: 0
FATIGUE: 0
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
## 2025-04-25 NOTE — PATIENT INSTRUCTIONS
It was a pleasure seeing you in the office.  At a time that is convenient for you, please obtain your bloodwork on a fasting basis.  We will then follow up on these results once available.  Please continue with routine gynecologic exams.  If you have access to your immunization records, please check to make sure that you have received your HPV vaccine series as well as an updated tetanus vaccine.  All other screening will start age-appropriate times in the future.  I would recommend heat alternating with ice to the upper back, stretching exercises, continued ibuprofen as tolerated, best taken with food.  You could consider massage therapy as well.  We will proceed with an x-ray and follow-up on results.  If you have any persistence or worsening of symptoms, please let us know.  Otherwise, we are happy to see you annually for your wellness visits.
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
## 2025-05-02 DIAGNOSIS — S00.06XA TICK BITE OF SCALP, INITIAL ENCOUNTER: ICD-10-CM
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
## 2025-05-02 DIAGNOSIS — S13.4XXD WHIPLASH INJURY TO NECK, SUBSEQUENT ENCOUNTER: Primary | ICD-10-CM

## 2025-05-02 RX ORDER — DOXYCYCLINE 100 MG/1
200 CAPSULE ORAL ONCE
Qty: 2 CAPSULE | Refills: 0 | Status: SHIPPED | OUTPATIENT
Start: 2025-05-02 | End: 2025-05-02

## 2025-05-10 LAB
25(OH)D3+25(OH)D2 SERPL-MCNC: 49 NG/ML (ref 30–100)
ALBUMIN SERPL-MCNC: 5.1 G/DL (ref 3.6–5.1)
ALP SERPL-CCNC: 45 U/L (ref 31–125)
ALT SERPL-CCNC: 11 U/L (ref 6–29)
ANION GAP SERPL CALCULATED.4IONS-SCNC: 10 MMOL/L (CALC) (ref 7–17)
AST SERPL-CCNC: 13 U/L (ref 10–30)
BASOPHILS # BLD AUTO: 98 CELLS/UL (ref 0–200)
BASOPHILS NFR BLD AUTO: 1.2 %
BILIRUB SERPL-MCNC: 0.7 MG/DL (ref 0.2–1.2)
BUN SERPL-MCNC: 10 MG/DL (ref 7–25)
CALCIUM SERPL-MCNC: 10.1 MG/DL (ref 8.6–10.2)
CHLORIDE SERPL-SCNC: 104 MMOL/L (ref 98–110)
CHOLEST SERPL-MCNC: 159 MG/DL
CHOLEST/HDLC SERPL: 2.7 (CALC)
CO2 SERPL-SCNC: 25 MMOL/L (ref 20–32)
CREAT SERPL-MCNC: 0.71 MG/DL (ref 0.5–0.96)
EGFRCR SERPLBLD CKD-EPI 2021: 120 ML/MIN/1.73M2
EOSINOPHIL # BLD AUTO: 148 CELLS/UL (ref 15–500)
EOSINOPHIL NFR BLD AUTO: 1.8 %
ERYTHROCYTE [DISTWIDTH] IN BLOOD BY AUTOMATED COUNT: 12.4 % (ref 11–15)
EST. AVERAGE GLUCOSE BLD GHB EST-MCNC: 103 MG/DL
EST. AVERAGE GLUCOSE BLD GHB EST-SCNC: 5.7 MMOL/L
GLUCOSE SERPL-MCNC: 90 MG/DL (ref 65–99)
HBA1C MFR BLD: 5.2 %
HCT VFR BLD AUTO: 44 % (ref 35–45)
HDLC SERPL-MCNC: 60 MG/DL
HGB BLD-MCNC: 14.2 G/DL (ref 11.7–15.5)
IRON SATN MFR SERPL: 43 % (CALC) (ref 16–45)
IRON SERPL-MCNC: 137 MCG/DL (ref 40–190)
LDLC SERPL CALC-MCNC: 84 MG/DL (CALC)
LYMPHOCYTES # BLD AUTO: 3009 CELLS/UL (ref 850–3900)
LYMPHOCYTES NFR BLD AUTO: 36.7 %
MCH RBC QN AUTO: 29.7 PG (ref 27–33)
MCHC RBC AUTO-ENTMCNC: 32.3 G/DL (ref 32–36)
MCV RBC AUTO: 92.1 FL (ref 80–100)
MONOCYTES # BLD AUTO: 730 CELLS/UL (ref 200–950)
MONOCYTES NFR BLD AUTO: 8.9 %
NEUTROPHILS # BLD AUTO: 4215 CELLS/UL (ref 1500–7800)
NEUTROPHILS NFR BLD AUTO: 51.4 %
NONHDLC SERPL-MCNC: 99 MG/DL (CALC)
PLATELET # BLD AUTO: 331 THOUSAND/UL (ref 140–400)
PMV BLD REES-ECKER: 10.7 FL (ref 7.5–12.5)
POTASSIUM SERPL-SCNC: 4.6 MMOL/L (ref 3.5–5.3)
PROT SERPL-MCNC: 8.1 G/DL (ref 6.1–8.1)
RBC # BLD AUTO: 4.78 MILLION/UL (ref 3.8–5.1)
SODIUM SERPL-SCNC: 139 MMOL/L (ref 135–146)
TIBC SERPL-MCNC: 322 MCG/DL (CALC) (ref 250–450)
TRIGL SERPL-MCNC: 69 MG/DL
TSH SERPL-ACNC: 4.45 MIU/L
VIT B12 SERPL-MCNC: 416 PG/ML (ref 200–1100)
WBC # BLD AUTO: 8.2 THOUSAND/UL (ref 3.8–10.8)

## 2025-05-14 ENCOUNTER — APPOINTMENT (OUTPATIENT)
Dept: RADIOLOGY | Facility: HOSPITAL | Age: 25
End: 2025-05-14
Payer: COMMERCIAL

## 2025-05-14 DIAGNOSIS — S13.4XXD WHIPLASH INJURY TO NECK, SUBSEQUENT ENCOUNTER: ICD-10-CM
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
## 2025-05-14 PROCEDURE — 72125 CT NECK SPINE W/O DYE: CPT

## 2025-07-22 DIAGNOSIS — Z79.3 DYSMENORRHEA TREATED WITH ORAL CONTRACEPTIVE: ICD-10-CM

## 2025-07-22 DIAGNOSIS — N94.6 DYSMENORRHEA TREATED WITH ORAL CONTRACEPTIVE: ICD-10-CM

## 2025-07-22 DIAGNOSIS — Z30.41 ENCOUNTER FOR SURVEILLANCE OF CONTRACEPTIVE PILLS: ICD-10-CM
